# Patient Record
Sex: FEMALE | Race: WHITE | Employment: UNEMPLOYED | URBAN - METROPOLITAN AREA
[De-identification: names, ages, dates, MRNs, and addresses within clinical notes are randomized per-mention and may not be internally consistent; named-entity substitution may affect disease eponyms.]

---

## 2019-01-22 ENCOUNTER — OFFICE VISIT (OUTPATIENT)
Dept: OBGYN CLINIC | Age: 44
End: 2019-01-22

## 2019-01-22 VITALS
TEMPERATURE: 97.4 F | BODY MASS INDEX: 31.57 KG/M2 | HEIGHT: 66 IN | SYSTOLIC BLOOD PRESSURE: 137 MMHG | HEART RATE: 84 BPM | RESPIRATION RATE: 18 BRPM | WEIGHT: 196.4 LBS | DIASTOLIC BLOOD PRESSURE: 87 MMHG | OXYGEN SATURATION: 100 %

## 2019-01-22 DIAGNOSIS — Z20.2 STD EXPOSURE: ICD-10-CM

## 2019-01-22 DIAGNOSIS — N76.0 ACUTE VAGINITIS: Primary | ICD-10-CM

## 2019-01-22 DIAGNOSIS — B00.9 HSV INFECTION: ICD-10-CM

## 2019-01-22 DIAGNOSIS — N90.89 VULVAR IRRITATION: ICD-10-CM

## 2019-01-22 RX ORDER — FLUCONAZOLE 150 MG/1
TABLET ORAL
Qty: 3 TAB | Refills: 0 | Status: SHIPPED | OUTPATIENT
Start: 2019-01-22

## 2019-01-22 RX ORDER — NYSTATIN AND TRIAMCINOLONE ACETONIDE 100000; 1 [USP'U]/G; MG/G
CREAM TOPICAL
Qty: 30 G | Refills: 1 | Status: SHIPPED | OUTPATIENT
Start: 2019-01-22

## 2019-01-22 NOTE — PROGRESS NOTES
Patient presents with c/o vulvar itching and burning. Admits to previous dx of HSV but with no prior outbreak. Reports discomfort from itching is 10/10. Denies odor but reports \"tan\" vaginal discharge.

## 2019-01-22 NOTE — PROGRESS NOTES
Name: Kary Mullen MRN: [id number] No obstetric history on file. YOB: 1975  Age: 37 y.o. Sex: female        Chief Complaint   Patient presents with    Vaginitis       HPI     Pt has a known h/o HSV dx by serology in 2015 per pt. She is not taking medication for HSV. She presents today with c/o pale tan vaginal discharge for 6 weeks with vaginal and vulvar irritation, redness and itching. She denies having a clumpy thick white discharge. Irritation is severe. Neosporin made sx worse. H/o endometrial ablation in 2005. OB History     No data available           PGyn  Social History     Substance and Sexual Activity   Sexual Activity Not Currently    Birth control/protection: Surgical         Past Medical History:   Diagnosis Date    Herpes 2016       Past Surgical History:   Procedure Laterality Date    ENDOMETRIAL CRYOABLATION      HX GASTRIC BYPASS  2007    LAP,TUBAL CAUTERY         No Known Allergies    No current outpatient medications on file prior to visit. No current facility-administered medications on file prior to visit. Social History     Socioeconomic History    Marital status:      Spouse name: Not on file    Number of children: Not on file    Years of education: Not on file    Highest education level: Not on file   Social Needs    Financial resource strain: Not on file    Food insecurity - worry: Not on file    Food insecurity - inability: Not on file    Transportation needs - medical: Not on file   [employer] needs - non-medical: Not on file   Occupational History    Not on file   Tobacco Use    Smoking status: Current Every Day Smoker     Packs/day: 0.50    Smokeless tobacco: Never Used   Substance and Sexual Activity    Alcohol use:  Yes     Alcohol/week: 3.6 - 4.2 oz     Types: 6 - 7 Glasses of wine per week    Drug use: No    Sexual activity: Not Currently     Birth control/protection: Surgical   Other Topics Concern    Not on file   Social History Narrative    Not on file       Family History   Problem Relation Age of Onset    No Known Problems Mother     No Known Problems Father     No Known Problems Maternal Grandmother     No Known Problems Maternal Grandfather     No Known Problems Paternal Grandmother     No Known Problems Paternal Grandfather        Review of Systems   All other systems reviewed and are negative. Visit Vitals  /87 (BP 1 Location: Right arm, BP Patient Position: Sitting)   Pulse 84   Temp 97.4 °F (36.3 °C) (Oral)   Resp 18   Ht 5' 6\" (1.676 m)   Wt 196 lb 6.4 oz (89.1 kg)   SpO2 100%   BMI 31.70 kg/m²       GENERAL:  Well developed, well nourished, in no distress  NEURO/PSYCHE: Grossly intact, normal mood and affect  HEENT: Normal cephalic, atraumatic, good dentition, neck supple. No thyromegaly  CV: regular rate and rhythm  LUNGS: clear to auscultation bilaterally, no wheezes, rhonchi or rales, good air entry with normal effort  ABDOMEN: + BS, soft without tenderness, no guarding, rebound or masses  EXTREMITIES: no edema or erythema noted  SKIN:  Warm, dry, no lesions  LYMPHATICS: No supraclavicular or inguinal nodes noted    PELVIC EXAM:  LABIA MAJORA: no masses, tenderness or lesions  LABIA MINORA: no masses, tenderness or lesions  CLITORIS: no masses, tenderness or lesions  URETHRA: normal appearing, no masses or tenderness  BLADDER: no fullness or tenderness  VAGINA: pink appearing vagina with copious light brown, greenish-yellow discharge. PERINEUM: approximately 4 cm of symmetrical irritation including bilateral labia majora extending onto perineum. 1.5 cm raised, erythematous lesion with comedone on right inner buttock-HSV culture performed on this lesion  CERVIX: No CMT or lesions  UTERUS: unable to tolerate BME due to discomfort      No results found for this or any previous visit (from the past 24 hour(s)). 1. Acute vaginitis    - CULTURE, HSV W/ TYPING;  Future  - UREAPLASMA / MYCO CULTURE; Future  - NUSWAB VAGINITIS PLUS; Future  - UREAPLASMA / MYCO CULTURE  - fluconazole (DIFLUCAN) 150 mg tablet; Take 1 tab po every other day x 3 doses. Dispense: 3 Tab; Refill: 0  Consider pelvic US to evaluate endometrium s/p ablation if cx negative. 2. Vulvar irritation    - nystatin-triamcinolone (MYCOLOG II) topical cream; Apply  to affected area three (3) times daily as needed for Skin Irritation or Itching. Dispense: 30 g; Refill: 1    3. STD exposure  Pt advised culture results needed to dx possible STDS. 4. HSV infection  HSV symptoms reviewed. All questions about STDS, vaginitis, and yeast and BV answered. 25 minutes spent face to face discussing the above symptoms, diagnosis, and plan of care. F/U 1-2 wks to review labs and eval tx.

## 2019-01-22 NOTE — PATIENT INSTRUCTIONS
Vaginitis: Care Instructions  Your Care Instructions    Vaginitis is soreness or infection of the vagina. This common problem can cause itching and burning. And it can cause a change in vaginal discharge. Sometimes it can cause pain during sex. Vaginitis may be caused by bacteria, yeast, or other germs. Some infections that cause it are caught from a sexual partner. Bath products, spermicides, and douches can irritate the vagina too. Some women have this problem during and after menopause. A drop in estrogen levels during this time can cause dryness, soreness, and pain during sex. Your doctor can give you medicine to treat an infection. And home care may help you feel better. For certain types of infections, your sex partner must be treated too. Follow-up care is a key part of your treatment and safety. Be sure to make and go to all appointments, and call your doctor if you are having problems. It's also a good idea to know your test results and keep a list of the medicines you take. How can you care for yourself at home? · If your doctor prescribed antibiotics, take them as directed. Do not stop taking them just because you feel better. You need to take the full course of antibiotics. · Take your medicines exactly as prescribed. Call your doctor if you think you are having a problem with your medicine. · Do not eat or drink anything that has alcohol if you are taking metronidazole (Flagyl). · If you have a yeast infection, use over-the-counter products as your doctor tells you to. Or take medicine your doctor prescribes exactly as directed. · Wash your vaginal area daily with water. You also can use a mild, unscented soap if you want. · Do not use scented bath products. And do not use vaginal sprays or douches. · Put a washcloth soaked in cool water on the area to relieve itching. Or you can take cool baths.   · If you have dryness because of menopause, use estrogen cream or pills that your doctor prescribes. · Ask your doctor about when it is okay to have sex. · Use a personal lubricant before sex if you have dryness. Examples are Astroglide, K-Y Jelly, and Wet Lubricant Gel. · Ask your doctor if your sex partner also needs treatment. When should you call for help? Call your doctor now or seek immediate medical care if:    · You have a fever and pelvic pain.    Watch closely for changes in your health, and be sure to contact your doctor if:    · You have bleeding other than your period.     · You do not get better as expected. Where can you learn more? Go to http://elias-sharri.info/. Enter Q855 in the search box to learn more about \"Vaginitis: Care Instructions. \"  Current as of: May 14, 2018  Content Version: 11.9  © 6419-0522 [employer]. Care instructions adapted under license by [employer] (which disclaims liability or warranty for this information). If you have questions about a medical condition or this instruction, always ask your healthcare professional. [address] any warranty or liability for your use of this information.

## 2019-01-28 LAB
A VAGINAE DNA VAG QL NAA+PROBE: ABNORMAL SCORE
BVAB2 DNA VAG QL NAA+PROBE: ABNORMAL SCORE
C ALBICANS DNA VAG QL NAA+PROBE: NEGATIVE
C GLABRATA DNA VAG QL NAA+PROBE: NEGATIVE
C TRACH RRNA SPEC QL NAA+PROBE: NEGATIVE
HSV SPEC CULT: NORMAL
M HOMINIS SPEC QL CULT: POSITIVE
MEGA1 DNA VAG QL NAA+PROBE: ABNORMAL SCORE
N GONORRHOEA RRNA SPEC QL NAA+PROBE: NEGATIVE
SPECIMEN STATUS REPORT, ROLRST: NORMAL
T VAGINALIS RRNA SPEC QL NAA+PROBE: POSITIVE
U UREALYTICUM SPEC QL CULT: POSITIVE

## 2019-02-01 ENCOUNTER — TELEPHONE (OUTPATIENT)
Dept: OBGYN CLINIC | Age: 44
End: 2019-02-01

## 2019-02-01 DIAGNOSIS — Z22.39 CARRIER OF UREAPLASMA UREALYTICUM: Primary | ICD-10-CM

## 2019-02-01 DIAGNOSIS — A49.3 MYCOPLASMA INFECTION: ICD-10-CM

## 2019-02-01 DIAGNOSIS — A59.9 TRICHIMONIASIS: ICD-10-CM

## 2019-02-01 RX ORDER — METRONIDAZOLE 500 MG/1
2000 TABLET ORAL ONCE
Qty: 4 TAB | Refills: 0 | Status: SHIPPED | OUTPATIENT
Start: 2019-02-01 | End: 2019-02-01

## 2019-02-01 RX ORDER — AZITHROMYCIN 500 MG/1
1000 TABLET, FILM COATED ORAL ONCE
Qty: 2 TAB | Refills: 0 | Status: SHIPPED | OUTPATIENT
Start: 2019-02-01 | End: 2019-02-01

## 2019-02-01 NOTE — TELEPHONE ENCOUNTER
Call placed to patient, reviewed results and new meds, scheduled patient for DIMITRY on 03/04/19 @ 1200

## 2023-01-23 NOTE — PROCEDURE NOTE: CLINICAL
PROCEDURE NOTE: YAG Capsulotomy OD. Diagnosis: Posterior Capsular Opacity. Anesthesia: Topical. Prior to commencing surgery patient identification, surgical procedure, site, and side were confirmed by Dr. Wilfrid Muse. Following topical proparacaine anesthesia, the patient was positioned at the YAG laser, a contact lens coupled to the cornea with methylcellulose and an axial posterior performed capsulotomy without complication . Excess methylcellulose was washed from the eye. One drop of Alphagan was instilled and the patient returned to the holding area having tolerated the procedure well and without complication. Reggie Steele

## 2023-01-23 NOTE — PATIENT DISCUSSION
Visually significant PCO present on exam today. Cannot improve vision with refraction/glasses at this time. Discussed treatment options including observation versus Yag capsulotomy. Laser recommended to improve vision. We discussed the risks/benefits of laser capsulotomy. All questions were answered. Patient elects to proceed with  Yag capsulotomy OD.

## 2024-08-19 ENCOUNTER — COMPREHENSIVE EXAM (OUTPATIENT)
Facility: LOCATION | Age: 49
End: 2024-08-19

## 2024-08-19 DIAGNOSIS — H52.12: ICD-10-CM

## 2024-08-19 DIAGNOSIS — H52.223: ICD-10-CM

## 2024-08-19 DIAGNOSIS — H52.4: ICD-10-CM

## 2024-08-19 PROCEDURE — 92015 DETERMINE REFRACTIVE STATE: CPT

## 2024-08-19 PROCEDURE — 92025KAL KAL CORNEAL TOPOGRAPHY CL, ELECTIVE

## 2024-08-19 PROCEDURE — 92004 COMPRE OPH EXAM NEW PT 1/>: CPT

## 2024-08-19 PROCEDURE — 92310-19

## 2024-08-19 ASSESSMENT — KERATOMETRY
OS_AXISANGLE_DEGREES: 047
OS_AXISANGLE2_DEGREES: 137
OD_K1POWER_DIOPTERS: 41.25
OS_K1POWER_DIOPTERS: 44.00
OD_K2POWER_DIOPTERS: 45.00
OS_K2POWER_DIOPTERS: 44.50
OD_AXISANGLE2_DEGREES: 19
OD_AXISANGLE_DEGREES: 109

## 2024-08-19 ASSESSMENT — VISUAL ACUITY
OD_SC: 20/40
OU_SC: 20/20
OS_CC: 20/20
OD_CC: 20/20
OU_SC: 20/30
OU_CC: 20/20
OD_SC: 20/25-1
OS_SC: 20/20
OS_SC: 20/40

## 2024-08-28 ENCOUNTER — CONTACT LENSES/GLASSES VISIT (OUTPATIENT)
Facility: LOCATION | Age: 49
End: 2024-08-28

## 2024-08-28 DIAGNOSIS — Z46.0: ICD-10-CM

## 2024-08-28 PROCEDURE — 92310-F CONTACT LENS FITTING FOLLOW UP

## 2024-08-28 ASSESSMENT — KERATOMETRY
OS_AXISANGLE_DEGREES: 047
OD_K2POWER_DIOPTERS: 45.00
OD_AXISANGLE_DEGREES: 109
OS_AXISANGLE2_DEGREES: 137
OD_AXISANGLE2_DEGREES: 19
OS_K1POWER_DIOPTERS: 44.00
OS_K2POWER_DIOPTERS: 44.50
OD_K1POWER_DIOPTERS: 41.25

## 2024-08-28 ASSESSMENT — VISUAL ACUITY
OD_CC: 20/30
OU_CC: 20/30
OU_CC: 20/40
OS_CC: 20/40
OS_CC: 20/25
OD_CC: 20/50